# Patient Record
Sex: FEMALE | Race: ASIAN | NOT HISPANIC OR LATINO | URBAN - METROPOLITAN AREA
[De-identification: names, ages, dates, MRNs, and addresses within clinical notes are randomized per-mention and may not be internally consistent; named-entity substitution may affect disease eponyms.]

---

## 2019-12-25 ENCOUNTER — EMERGENCY (EMERGENCY)
Facility: HOSPITAL | Age: 54
LOS: 1 days | Discharge: HOME | End: 2019-12-25
Admitting: EMERGENCY MEDICINE
Payer: MEDICAID

## 2019-12-25 VITALS
OXYGEN SATURATION: 100 % | RESPIRATION RATE: 16 BRPM | HEART RATE: 82 BPM | SYSTOLIC BLOOD PRESSURE: 180 MMHG | DIASTOLIC BLOOD PRESSURE: 110 MMHG | TEMPERATURE: 98 F

## 2019-12-25 VITALS
DIASTOLIC BLOOD PRESSURE: 109 MMHG | HEART RATE: 89 BPM | OXYGEN SATURATION: 100 % | RESPIRATION RATE: 17 BRPM | SYSTOLIC BLOOD PRESSURE: 154 MMHG

## 2019-12-25 PROCEDURE — 99283 EMERGENCY DEPT VISIT LOW MDM: CPT

## 2019-12-25 RX ORDER — OXYCODONE AND ACETAMINOPHEN 5; 325 MG/1; MG/1
1 TABLET ORAL ONCE
Refills: 0 | Status: DISCONTINUED | OUTPATIENT
Start: 2019-12-25 | End: 2019-12-25

## 2019-12-25 RX ORDER — IBUPROFEN 200 MG
1 TABLET ORAL
Qty: 30 | Refills: 0
Start: 2019-12-25

## 2019-12-25 RX ORDER — OXYCODONE AND ACETAMINOPHEN 5; 325 MG/1; MG/1
1 TABLET ORAL
Qty: 8 | Refills: 0
Start: 2019-12-25 | End: 2019-12-26

## 2019-12-25 RX ADMIN — OXYCODONE AND ACETAMINOPHEN 1 TABLET(S): 5; 325 TABLET ORAL at 21:02

## 2019-12-25 NOTE — ED PROVIDER NOTE - CLINICAL SUMMARY MEDICAL DECISION MAKING FREE TEXT BOX
55 y/o F w/ tooth pain, on abx, due for a root canal, here for pain control. No signs of gingival edema to suggest abscess. Plan pain control, outpt dental follow up. Not starting abx as pt is already taking them.

## 2019-12-25 NOTE — ED PROVIDER NOTE - NS ED ROS FT
ROS:  GENERAL: No fever, no chills  EYES: no change in vision  HEENT: no trouble swallowing, no trouble speaking, +tooth pain  CARDIAC: no chest pain  PULMONARY: no cough, no shortness of breath  GI: no abdominal pain, no nausea, no vomiting, no diarrhea, no constipation  SKIN: no rashes

## 2019-12-25 NOTE — ED PROVIDER NOTE - PATIENT PORTAL LINK FT
You can access the FollowMyHealth Patient Portal offered by [facility] by registering at the following website: [email or web address]. By joining [facility]’s FollowMyHealth portal, you will also be able to view your health information using other applications (apps) compatible with our system.

## 2019-12-25 NOTE — ED PROVIDER NOTE - OBJECTIVE STATEMENT
55 y/o F PMHx HTN here c/o left upper tooth pain x 1 month, intermittent, worse today. Has seen multiple dentists and told she needs a root canal. Pt just finished a course of abx today. Has not followed up with her dentist yet. Taking Motrin at home w/o relief of pain. Denies fevers, chills, facial swelling or any other complaints.

## 2019-12-25 NOTE — ED PROVIDER NOTE - NSFOLLOWUPINSTRUCTIONS_ED_ALL_ED_FT
Follow up with your private dentist within 48-72 hrs or our clinic [phone]. Take Motrin 600mg every 8hrs with food for pain. Take percocet 1-2 tabs every 6hrs for severe pain- caution drowsiness- do not drive or operate heavy machinery.  Worsening pain, new facial swelling, fever, chills return to ER

## 2020-01-01 ENCOUNTER — OUTPATIENT (OUTPATIENT)
Dept: OUTPATIENT SERVICES | Facility: HOSPITAL | Age: 55
LOS: 1 days | End: 2020-01-01
Payer: MEDICAID

## 2020-01-01 PROCEDURE — G9001: CPT

## 2020-01-06 DIAGNOSIS — Z71.89 OTHER SPECIFIED COUNSELING: ICD-10-CM

## 2024-05-13 ENCOUNTER — INPATIENT (INPATIENT)
Facility: HOSPITAL | Age: 59
LOS: 0 days | Discharge: HOME | End: 2024-05-14
Attending: STUDENT IN AN ORGANIZED HEALTH CARE EDUCATION/TRAINING PROGRAM | Admitting: STUDENT IN AN ORGANIZED HEALTH CARE EDUCATION/TRAINING PROGRAM
Payer: MEDICAID

## 2024-05-13 VITALS
SYSTOLIC BLOOD PRESSURE: 168 MMHG | DIASTOLIC BLOOD PRESSURE: 90 MMHG | TEMPERATURE: 98 F | OXYGEN SATURATION: 98 % | RESPIRATION RATE: 18 BRPM | HEART RATE: 96 BPM

## 2024-05-13 LAB
ALBUMIN SERPL ELPH-MCNC: 4.1 G/DL — SIGNIFICANT CHANGE UP (ref 3.3–5)
ALP SERPL-CCNC: 92 U/L — SIGNIFICANT CHANGE UP (ref 40–120)
ALT FLD-CCNC: 25 U/L — SIGNIFICANT CHANGE UP (ref 4–33)
ANION GAP SERPL CALC-SCNC: 15 MMOL/L — HIGH (ref 7–14)
AST SERPL-CCNC: 19 U/L — SIGNIFICANT CHANGE UP (ref 4–32)
BASOPHILS # BLD AUTO: 0.05 K/UL — SIGNIFICANT CHANGE UP (ref 0–0.2)
BASOPHILS NFR BLD AUTO: 0.5 % — SIGNIFICANT CHANGE UP (ref 0–2)
BILIRUB SERPL-MCNC: 0.2 MG/DL — SIGNIFICANT CHANGE UP (ref 0.2–1.2)
BUN SERPL-MCNC: 16 MG/DL — SIGNIFICANT CHANGE UP (ref 7–23)
CALCIUM SERPL-MCNC: 9.5 MG/DL — SIGNIFICANT CHANGE UP (ref 8.4–10.5)
CHLORIDE SERPL-SCNC: 103 MMOL/L — SIGNIFICANT CHANGE UP (ref 98–107)
CO2 SERPL-SCNC: 23 MMOL/L — SIGNIFICANT CHANGE UP (ref 22–31)
CREAT SERPL-MCNC: 0.55 MG/DL — SIGNIFICANT CHANGE UP (ref 0.5–1.3)
EGFR: 108 ML/MIN/1.73M2 — SIGNIFICANT CHANGE UP
EOSINOPHIL # BLD AUTO: 0.11 K/UL — SIGNIFICANT CHANGE UP (ref 0–0.5)
EOSINOPHIL NFR BLD AUTO: 1.2 % — SIGNIFICANT CHANGE UP (ref 0–6)
GLUCOSE SERPL-MCNC: 110 MG/DL — HIGH (ref 70–99)
HCT VFR BLD CALC: 44.6 % — SIGNIFICANT CHANGE UP (ref 34.5–45)
HGB BLD-MCNC: 13.9 G/DL — SIGNIFICANT CHANGE UP (ref 11.5–15.5)
IANC: 4.52 K/UL — SIGNIFICANT CHANGE UP (ref 1.8–7.4)
IMM GRANULOCYTES NFR BLD AUTO: 0.2 % — SIGNIFICANT CHANGE UP (ref 0–0.9)
LYMPHOCYTES # BLD AUTO: 3.5 K/UL — HIGH (ref 1–3.3)
LYMPHOCYTES # BLD AUTO: 38.3 % — SIGNIFICANT CHANGE UP (ref 13–44)
MCHC RBC-ENTMCNC: 25.9 PG — LOW (ref 27–34)
MCHC RBC-ENTMCNC: 31.2 GM/DL — LOW (ref 32–36)
MCV RBC AUTO: 83.2 FL — SIGNIFICANT CHANGE UP (ref 80–100)
MONOCYTES # BLD AUTO: 0.94 K/UL — HIGH (ref 0–0.9)
MONOCYTES NFR BLD AUTO: 10.3 % — SIGNIFICANT CHANGE UP (ref 2–14)
NEUTROPHILS # BLD AUTO: 4.52 K/UL — SIGNIFICANT CHANGE UP (ref 1.8–7.4)
NEUTROPHILS NFR BLD AUTO: 49.5 % — SIGNIFICANT CHANGE UP (ref 43–77)
NRBC # BLD: 0 /100 WBCS — SIGNIFICANT CHANGE UP (ref 0–0)
NRBC # FLD: 0 K/UL — SIGNIFICANT CHANGE UP (ref 0–0)
NT-PROBNP SERPL-SCNC: 39 PG/ML — SIGNIFICANT CHANGE UP
PLATELET # BLD AUTO: 315 K/UL — SIGNIFICANT CHANGE UP (ref 150–400)
POTASSIUM SERPL-MCNC: 4.4 MMOL/L — SIGNIFICANT CHANGE UP (ref 3.5–5.3)
POTASSIUM SERPL-SCNC: 4.4 MMOL/L — SIGNIFICANT CHANGE UP (ref 3.5–5.3)
PROT SERPL-MCNC: 8 G/DL — SIGNIFICANT CHANGE UP (ref 6–8.3)
RBC # BLD: 5.36 M/UL — HIGH (ref 3.8–5.2)
RBC # FLD: 16.1 % — HIGH (ref 10.3–14.5)
SODIUM SERPL-SCNC: 141 MMOL/L — SIGNIFICANT CHANGE UP (ref 135–145)
TROPONIN T, HIGH SENSITIVITY RESULT: 10 NG/L — SIGNIFICANT CHANGE UP
WBC # BLD: 9.14 K/UL — SIGNIFICANT CHANGE UP (ref 3.8–10.5)
WBC # FLD AUTO: 9.14 K/UL — SIGNIFICANT CHANGE UP (ref 3.8–10.5)

## 2024-05-13 PROCEDURE — 99285 EMERGENCY DEPT VISIT HI MDM: CPT

## 2024-05-13 PROCEDURE — 71046 X-RAY EXAM CHEST 2 VIEWS: CPT | Mod: 26

## 2024-05-13 RX ORDER — ASPIRIN/CALCIUM CARB/MAGNESIUM 324 MG
324 TABLET ORAL ONCE
Refills: 0 | Status: COMPLETED | OUTPATIENT
Start: 2024-05-13 | End: 2024-05-13

## 2024-05-13 RX ADMIN — Medication 324 MILLIGRAM(S): at 20:19

## 2024-05-13 NOTE — ED PROVIDER NOTE - OBJECTIVE STATEMENT
58 year old morbidly obese fm with pmhx psoriasis, heart murmur presents to the ED with 1 week of intermittent chest pain. patient reports pain to left side "which moves" patient reports not taking anything for symptoms and endorses no relieving or exacerbating factors. patient reports being told at [facility] "many years ago" that she has a murmur but never had it worked up. patient denies pitting edema, Shortness of Breath, abdominal pain, Nausea/Vomiting/Diarrhea, dizziness, weakness, confusion, vision changes, urinary symptoms, syncope, falls, trauma, discharge, bleeding, fevers. patient never seen by cardiologist, no prior echo or stress done

## 2024-05-13 NOTE — ED PROVIDER NOTE - ATTENDING APP SHARED VISIT CONTRIBUTION OF CARE
Attending MD Eagle:  I performed a history and physical exam of the patient and discussed their management with the PA. I reviewed the PA's note and agree with the documented findings and plan of care. My medical decision making and observations are found above.

## 2024-05-13 NOTE — ED PROVIDER NOTE - PHYSICAL EXAMINATION
On Physical Exam:  General: well appearing, in NAD, speaking clearly in full sentences and without difficulty; cooperative with exam  HEENT: PERRL, MMM  Neck: no neck tenderness, no nuchal rigidity  Cardiac: Murmur, normal s1, s2; RRR; no GR  Lungs: CTABL  Abdomen: soft nontender/nondistended  : no bladder tenderness or distension  Skin: warm, intact, no rash  Extremities: no pitting edema, no peripheral edema, no gross deformities  Neuro: no gross neurologic deficits

## 2024-05-13 NOTE — ED PROVIDER NOTE - CLINICAL SUMMARY MEDICAL DECISION MAKING FREE TEXT BOX
58 year old morbidly obese fm with pmhx psoriasis, heart murmur presents to the ED with 1 week of intermittent chest pain. no exacerbating factors  r/o ACS vs MSK  labs, CXR, EKG, cardiac monitoring, ASA, reassess likely CDU for cardiac work up 58 year old morbidly obese fm with pmhx psoriasis, heart murmur presents to the ED with 1 week of intermittent chest pain. no exacerbating factors  r/o ACS vs MSK  labs, CXR, EKG, cardiac monitoring, ASA, reassess likely CDU for cardiac work up    Attending MD Eagle.  Agree with above.  Pt is a 57 yo fem with CP x 1 wk no hx of ACS eval.  Also noted murmur today, unclear origin or chroncity.  Planned ACS screening and overnight stay for tele monitoring and probable stress/echo 2/2 lack of prev eval and concerning hx of CP with potential new murmur.

## 2024-05-14 ENCOUNTER — RESULT REVIEW (OUTPATIENT)
Age: 59
End: 2024-05-14

## 2024-05-14 ENCOUNTER — TRANSCRIPTION ENCOUNTER (OUTPATIENT)
Age: 59
End: 2024-05-14

## 2024-05-14 VITALS
DIASTOLIC BLOOD PRESSURE: 80 MMHG | SYSTOLIC BLOOD PRESSURE: 143 MMHG | RESPIRATION RATE: 18 BRPM | HEART RATE: 94 BPM | OXYGEN SATURATION: 100 % | TEMPERATURE: 99 F

## 2024-05-14 DIAGNOSIS — R07.9 CHEST PAIN, UNSPECIFIED: ICD-10-CM

## 2024-05-14 DIAGNOSIS — Z29.9 ENCOUNTER FOR PROPHYLACTIC MEASURES, UNSPECIFIED: ICD-10-CM

## 2024-05-14 DIAGNOSIS — J02.9 ACUTE PHARYNGITIS, UNSPECIFIED: ICD-10-CM

## 2024-05-14 DIAGNOSIS — I10 ESSENTIAL (PRIMARY) HYPERTENSION: ICD-10-CM

## 2024-05-14 DIAGNOSIS — Z98.890 OTHER SPECIFIED POSTPROCEDURAL STATES: Chronic | ICD-10-CM

## 2024-05-14 LAB — TROPONIN T, HIGH SENSITIVITY RESULT: 8 NG/L — SIGNIFICANT CHANGE UP

## 2024-05-14 PROCEDURE — 93970 EXTREMITY STUDY: CPT | Mod: 26

## 2024-05-14 PROCEDURE — 93016 CV STRESS TEST SUPVJ ONLY: CPT

## 2024-05-14 PROCEDURE — 93018 CV STRESS TEST I&R ONLY: CPT

## 2024-05-14 PROCEDURE — 78451 HT MUSCLE IMAGE SPECT SING: CPT | Mod: 26

## 2024-05-14 PROCEDURE — 93306 TTE W/DOPPLER COMPLETE: CPT | Mod: 26

## 2024-05-14 PROCEDURE — 99223 1ST HOSP IP/OBS HIGH 75: CPT

## 2024-05-14 RX ORDER — AMLODIPINE BESYLATE 2.5 MG/1
1 TABLET ORAL
Qty: 30 | Refills: 0
Start: 2024-05-14 | End: 2024-06-12

## 2024-05-14 RX ORDER — ENOXAPARIN SODIUM 100 MG/ML
40 INJECTION SUBCUTANEOUS EVERY 24 HOURS
Refills: 0 | Status: DISCONTINUED | OUTPATIENT
Start: 2024-05-14 | End: 2024-05-14

## 2024-05-14 RX ORDER — CEFUROXIME AXETIL 250 MG
1 TABLET ORAL
Refills: 0 | DISCHARGE

## 2024-05-14 RX ORDER — ASCORBIC ACID 60 MG
1 TABLET,CHEWABLE ORAL
Refills: 0 | DISCHARGE

## 2024-05-14 RX ORDER — ENOXAPARIN SODIUM 100 MG/ML
40 INJECTION SUBCUTANEOUS EVERY 12 HOURS
Refills: 0 | Status: DISCONTINUED | OUTPATIENT
Start: 2024-05-14 | End: 2024-05-14

## 2024-05-14 RX ORDER — ASCORBIC ACID 60 MG
500 TABLET,CHEWABLE ORAL DAILY
Refills: 0 | Status: DISCONTINUED | OUTPATIENT
Start: 2024-05-14 | End: 2024-05-14

## 2024-05-14 RX ORDER — CEFUROXIME AXETIL 250 MG
500 TABLET ORAL EVERY 12 HOURS
Refills: 0 | Status: DISCONTINUED | OUTPATIENT
Start: 2024-05-14 | End: 2024-05-14

## 2024-05-14 RX ORDER — PANTOPRAZOLE SODIUM 20 MG/1
1 TABLET, DELAYED RELEASE ORAL
Qty: 30 | Refills: 0
Start: 2024-05-14 | End: 2024-06-12

## 2024-05-14 RX ORDER — AMLODIPINE BESYLATE 2.5 MG/1
5 TABLET ORAL DAILY
Refills: 0 | Status: DISCONTINUED | OUTPATIENT
Start: 2024-05-14 | End: 2024-05-14

## 2024-05-14 RX ORDER — ACETAMINOPHEN 500 MG
650 TABLET ORAL EVERY 6 HOURS
Refills: 0 | Status: DISCONTINUED | OUTPATIENT
Start: 2024-05-14 | End: 2024-05-14

## 2024-05-14 RX ORDER — AMLODIPINE BESYLATE 2.5 MG/1
1 TABLET ORAL
Refills: 0 | DISCHARGE

## 2024-05-14 RX ADMIN — Medication 500 MILLIGRAM(S): at 11:46

## 2024-05-14 RX ADMIN — Medication 500 MILLIGRAM(S): at 07:29

## 2024-05-14 RX ADMIN — ENOXAPARIN SODIUM 40 MILLIGRAM(S): 100 INJECTION SUBCUTANEOUS at 17:33

## 2024-05-14 RX ADMIN — AMLODIPINE BESYLATE 5 MILLIGRAM(S): 2.5 TABLET ORAL at 05:28

## 2024-05-14 RX ADMIN — Medication 500 MILLIGRAM(S): at 17:33

## 2024-05-14 NOTE — PROGRESS NOTE ADULT - SUBJECTIVE AND OBJECTIVE BOX
Alesha Lee  [facility] Medicine  Pager #[phone]  Available on [employer] Teams    Patient is a 58y old  Female who presents with a chief complaint of chest pain x1 week (14 May 2024 02:51)      SUBJECTIVE / OVERNIGHT EVENTS: Patient seen and examined at bedside. Pt's family at bedside. Pt endorses ongoing discomfort in the middle of her chest.    ADDITIONAL REVIEW OF SYSTEMS:    MEDICATIONS  (STANDING):  amLODIPine   Tablet 5 milliGRAM(s) Oral daily  ascorbic acid 500 milliGRAM(s) Oral daily  cefuroxime   Tablet 500 milliGRAM(s) Oral every 12 hours  enoxaparin Injectable 40 milliGRAM(s) SubCutaneous every 12 hours    MEDICATIONS  (PRN):  acetaminophen     Tablet .. 650 milliGRAM(s) Oral every 6 hours PRN Temp greater or equal to 38C (100.4F), Mild Pain (1 - 3)      CAPILLARY BLOOD GLUCOSE        I&O's Summary      PHYSICAL EXAM:    Vital Signs Last 24 Hrs  T(C): 36.4 (14 May 2024 09:30), Max: 36.7 (13 May 2024 18:25)  T(F): 97.6 (14 May 2024 09:30), Max: 98.1 (13 May 2024 18:25)  HR: 98 (14 May 2024 09:30) (80 - 98)  BP: 139/89 (14 May 2024 09:30) (139/89 - 168/90)  BP(mean): --  RR: 17 (14 May 2024 09:30) (16 - 18)  SpO2: 98% (14 May 2024 09:30) (94% - 100%)    Parameters below as of 14 May 2024 09:30  Patient On (Oxygen Delivery Method): room air    CONSTITUTIONAL: NAD, well-developed, obese  RESPIRATORY: Normal respiratory effort; lungs are clear to auscultation bilaterally  CARDIOVASCULAR: Regular rate and rhythm, normal S1 and S2, +systolic murmur  ABDOMEN: Soft, nontender to palpation, normoactive bowel sounds  PSYCH: A+O to person, place, and time; affect appropriate  NEUROLOGY: CN 2-12 are intact and symmetric; no gross sensory deficits   SKIN: No rashes; no palpable lesions    LABS:                        13.9   9.14  )-----------( 315      ( 13 May 2024 21:17 )             44.6     05-13    141  |  103  |  16  ----------------------------<  110<H>  4.4   |  23  |  0.55    Ca    9.5      13 May 2024 21:17    TPro  8.0  /  Alb  4.1  /  TBili  0.2  /  DBili  x   /  AST  19  /  ALT  25  /  AlkPhos  92  05-13          Urinalysis Basic - ( 13 May 2024 21:17 )    Color: x / Appearance: x / SG: x / pH: x  Gluc: 110 mg/dL / Ketone: x  / Bili: x / Urobili: x   Blood: x / Protein: x / Nitrite: x   Leuk Esterase: x / RBC: x / WBC x   Sq Epi: x / Non Sq Epi: x / Bacteria: x          RADIOLOGY & ADDITIONAL TESTS: No new imaging  Results Reviewed: Yes  Imaging Personally Reviewed:  Electrocardiogram Personally Reviewed:    COORDINATION OF CARE:  Care Discussed with Consultants/Other Providers [Y/N]:  Prior or Outpatient Records Reviewed [Y/N]:

## 2024-05-14 NOTE — PROGRESS NOTE ADULT - PROBLEM SELECTOR PLAN 1
Atypical CP. Not exertional  -wells score 0. Not tachycardic, not hypoxic. No pleuritic CP. Low suspicion for PE. Has mild LE edema bilaterally but typical for her psoriasis per patient - duplex negative for DVT  -monitor on tele  -echo  -trop 10--8, EKG with TWI in V1-V6. low suspicion for ACS  -NST ordered  -cardiology consulted. pt states she has never seen a cardiologist in the past but was told that she has a heart murmur

## 2024-05-14 NOTE — DISCHARGE NOTE PROVIDER - PROVIDER TOKENS
FREE:[LAST:[PCP],PHONE:[(   )    -],FAX:[(   )    -]] FREE:[LAST:[PCP],PHONE:[(   )    -],FAX:[(   )    -]],PROVIDER:[TOKEN:[[id number]]]

## 2024-05-14 NOTE — ED ADULT NURSE NOTE - OBJECTIVE STATEMENT
pt presents to ED A&04 ambulatory at baseline coming in complaining of intermittent episodes of chest pain radiating to left side. history of heart murmur, psoriasis. Respirations even and unlabored. Lung sounds clear with equal chest rise bilaterally. ABD is soft, non tender, non distended with normal active bowel sounds No complaints of headache, nausea, dizziness, vomiting  SOB, fever, chills verbalized. 20GLAC in place, NSR on cardiac monitor.

## 2024-05-14 NOTE — DISCHARGE NOTE PROVIDER - NSDCCPTREATMENT_GEN_ALL_CORE_FT
PRINCIPAL PROCEDURE  Procedure: Echo 2D  Findings and Treatment: 5/14/2024:      1. Left ventricular systolic function is normal with an ejection fraction of 69 % by Glez's method of disks. There are no regional wall motion abnormalities seen.   2. Severe left ventricular hypertrophy.   3. Normal right ventricular cavity size and normal systolic function. Tricuspid annular plane systolic excursion (TAPSE) is 2.2 cm (normal >=1.7 cm).   4. The left atrium is mildly dilated.   5. The right atrium is normal in size.   6. The inferior vena cava is normal in size measuring 1.46 cm in diameter, (normal <2.1cm) with normal inspiratory collapse (normal >50%) consistent with normal right atrial pressure (~3, range 0-5mmHg).   7. No pericardial effusion seen.   8. Technically difficult image quality.

## 2024-05-14 NOTE — CONSULT NOTE ADULT - ASSESSMENT
Chest pain   no evidence of acute MI   Echo   Stress test     HTN  monitor for now   consider low dose amlodipine if still elevated      Advanced care planning was discussed with patient and family.  Risks, benefits and alternatives of the cardiac treatments and medical therapy including procedures were discussed in detail and all questions were answered. Importance of compliance with medical therapy and lifestyle modification to improve cardiovascular health were addressed. Appropriate forms and patient educational materials were reviewed. 30 minutes face to face spent.

## 2024-05-14 NOTE — DISCHARGE NOTE PROVIDER - HOSPITAL COURSE
58F with PMHx obesity, psoriasis, heart murmur, HTN presenting with chest pain x1 week.      Problem/Plan - 1:  ·  Problem: Chest pain.   ·  Plan: Atypical CP. Not exertional  -wells score 0. Not tachycardic, not hypoxic. No pleuritic CP. Low suspicion for PE. Has mild LE edema bilaterally but typical for her psoriasis per patient - duplex negative for DVT  -monitor on tele  -echo- EF 69%  -trop 10--8, EKG with TWI in V1-V6. low suspicion for ACS  -NST- not suggestive for ischemia   -cardiology consulted. pt states she has never seen a cardiologist in the past but was told that she has a heart murmur.    Problem/Plan - 2:  ·  Problem: HTN (hypertension).   ·  Plan: C/w amlodipine 5mg qd.    Problem/Plan - 3:  ·  Problem: Throat infection.   ·  Plan: C/w cefuroxime 500mg BID, end date 5/18.    Problem/Plan - 4:  ·  Problem: Need for prophylactic measure.   ·  Plan: Lovenox  DASH/TLC diet.   58F with PMHx obesity, psoriasis, heart murmur, HTN presenting with chest pain x1 week.    Patient underwent stress testing with normal results, other than elevated BP being noted. TTE showed diastolic dysfunction and increased LVH. Patient's amlodipine increased to 10mg. Patient also to be started on PPI trial for atypical chest pain.

## 2024-05-14 NOTE — H&P ADULT - HISTORY OF PRESENT ILLNESS
58F with PMHx obesity, psoriasis, heart murmur, HTN presenting with chest pain x1 week. Pain feels "like a pain moving around my chest", 5/10, nonradiating and comes and goes. Pain is not associated with exertion or PO intake. She is not on blood thinners. Denies prior cardiac w/u. Told of heart murmur in the past? She has LE swelling that is intermittent but dependent on when her psoriasis flares up. Denies asymmetric LE swelling, hx DVT/PE, recent travel, pleuritic CP, SOB. Denies fevers, chills, cough, LH, dizziness, palpitations, abdominal pain, vomiting, diarrhea, dysuria, hematuria. Of not was started on cefuroxime 500mg BID x5 days yesterday for a throat infection by PMD.

## 2024-05-14 NOTE — DISCHARGE NOTE NURSING/CASE MANAGEMENT/SOCIAL WORK - PATIENT PORTAL LINK FT
You can access the FollowMyHealth Patient Portal offered by [facility] by registering at the following website: [email or web address]. By joining [employer]’s FollowMyHealth portal, you will also be able to view your health information using other applications (apps) compatible with our system.

## 2024-05-14 NOTE — DISCHARGE NOTE PROVIDER - CARE PROVIDER_API CALL
PCP,   Phone: (   )    -  Fax: (   )    -  Follow Up Time:    PCP,   Phone: (   )    -  Fax: (   )    -  Follow Up Time:     Noe Granados  Cardiovascular Disease  [address] [address]  Phone: [phone]  Fax: [phone]  Follow Up Time:

## 2024-05-14 NOTE — H&P ADULT - PROBLEM SELECTOR PLAN 1
Atypical CP. Not exertional  -wells score 0. Not tachycardic, not hypoxic. No pleuritic CP. Low suspicion for PE. Has mild LE edema bilaterally but typical for her psoriasis per patient - duplex ordered  -tele  -echo  -trop 10--8, EKG with TWI in V1-V6. CP resolved. Not ACS  -NST ordered  -cardiology c/s in AM

## 2024-05-14 NOTE — DISCHARGE NOTE PROVIDER - NSDCCPCAREPLAN_GEN_ALL_CORE_FT
PRINCIPAL DISCHARGE DIAGNOSIS  Diagnosis: Chest pain  Assessment and Plan of Treatment: Follow-up with your primary provider/cardiologist as outpatient for ongoing care. If you have persistent chest pain, shortness of breath, heart palpitations, or dizziness you should return to the emergency room.        SECONDARY DISCHARGE DIAGNOSES  Diagnosis: Throat infection  Assessment and Plan of Treatment: Please continue with you pill antibiotic to completed the course .    Diagnosis: HTN (hypertension)  Assessment and Plan of Treatment: .Continue blood pressure medication regimen as directed. Monitor for any visual changes, headaches or dizziness.  Monitor blood pressure regularly.  Follow up with your primary care provider for further management for high blood pressure.     PRINCIPAL DISCHARGE DIAGNOSIS  Diagnosis: Chest pain  Assessment and Plan of Treatment: Follow-up with your primary provider/cardiologist as outpatient for ongoing care. Your stress test did not show any abnormalities with your heart function. You also had an echocardiogram of your heart which showed changes related to chronic high blood pressure. You will be prescribed pantoprazole, which is a medication for gastric reflux, in order to see if it may help with your symptoms. Follow up with cardiologist Dr. Granados outpatient. If you have persistent chest pain, shortness of breath, heart palpitations, or dizziness you should return to the emergency room.        SECONDARY DISCHARGE DIAGNOSES  Diagnosis: HTN (hypertension)  Assessment and Plan of Treatment: Your amlodipine dose was increased to 10mg because your blood pressure was elevated in the hospital. Monitor blood pressure regularly.  Follow up with your primary care provider for further management for high blood pressure.    Diagnosis: Throat infection  Assessment and Plan of Treatment: Continue with your antibiotics until they are finished.

## 2024-05-14 NOTE — DISCHARGE NOTE PROVIDER - NSDCMRMEDTOKEN_GEN_ALL_CORE_FT
amLODIPine 5 mg oral tablet: 1 tab(s) orally once a day  cefuroxime 500 mg oral tablet: 1 tab(s) orally 2 times a day  Protonix 40 mg oral delayed release tablet: 1 tab(s) orally once a day  Vitamin C 500 mg oral tablet: 1 tab(s) orally once a day   amLODIPine 10 mg oral tablet: 1 tab(s) orally once a day  cefuroxime 500 mg oral tablet: 1 tab(s) orally 2 times a day  Protonix 40 mg oral delayed release tablet: 1 tab(s) orally once a day  Vitamin C 500 mg oral tablet: 1 tab(s) orally once a day

## 2024-05-14 NOTE — H&P ADULT - NSHPLABSRESULTS_GEN_ALL_CORE
Personally reviewed labs:                        13.9   9.14  )-----------( 315      ( 13 May 2024 21:17 )             44.6     05-13-24 @ 21:17    141  |  103  |  16             --------------------------< 110<H>     4.4  |  23  | 0.55    eGFR AA: --  eGFR N-AA: --    Calcium: 9.5  Phosphorus: --  Magnesium: --    AST: 19    ALT: 25  AlkPhos: 92  Protein: 8.0  Albumin: 4.1  TBili: 0.2  D-Bili: --    Troponin 10--8      RADIOLOGY & ADDITIONAL TESTS:    EKG my independent interpretation: sinus tachycardia @ 102bpm, RSR' pattern, TWI in V1-V6    CXR my independent interpretation: clear lungs

## 2024-05-14 NOTE — CONSULT NOTE ADULT - SUBJECTIVE AND OBJECTIVE BOX
CHIEF COMPLAINT:Patient is a 58y old  Female who presents with a chief complaint of chest pain x1 week (14 May 2024 11:21)      HISTORY OF PRESENT ILLNESS:    58 female with history a below presents with chest pain , squeezing sensation substernal , intermittent for the past few days   no known triggers   no sob  no palpitation   no syncope     PAST MEDICAL & SURGICAL HISTORY:  HTN (hypertension)      Psoriasis      Heart murmur      H/O removal of cyst              MEDICATIONS:  amLODIPine   Tablet 5 milliGRAM(s) Oral daily  enoxaparin Injectable 40 milliGRAM(s) SubCutaneous every 12 hours    cefuroxime   Tablet 500 milliGRAM(s) Oral every 12 hours      acetaminophen     Tablet .. 650 milliGRAM(s) Oral every 6 hours PRN        ascorbic acid 500 milliGRAM(s) Oral daily      FAMILY HISTORY:  No pertinent family history in first degree relatives        Non-contributory    SOCIAL HISTORY:    No tobacco, drugs or etoh    Allergies    No Known Allergies    Intolerances    	    REVIEW OF SYSTEMS:  as above  The rest of the 14 points ROS reviewed and except above they are unremarkable.        PHYSICAL EXAM:  T(C): 36.4 (05-14-24 @ 09:30), Max: 36.7 (05-13-24 @ 18:25)  HR: 98 (05-14-24 @ 09:30) (80 - 98)  BP: 139/89 (05-14-24 @ 09:30) (139/89 - 168/90)  RR: 17 (05-14-24 @ 09:30) (16 - 18)  SpO2: 98% (05-14-24 @ 09:30) (94% - 100%)  Wt(kg): --  I&O's Summary      JVP: Normal  Neck: supple  Lung: clear   CV: S1 S2 , Murmur:  Abd: soft  Ext: No edema  neuro: Awake / alert  Psych: flat affect  Skin: normal      LABS/DATA:    TELEMETRY: 	    ECG:  	   	  CARDIAC MARKERS:                        8 <<== 05-14-24 @ 00:41                10 <<== 05-13-24 @ 21:17                              13.9   9.14  )-----------( 315      ( 13 May 2024 21:17 )             44.6     05-13    141  |  103  |  16  ----------------------------<  110<H>  4.4   |  23  |  0.55    Ca    9.5      13 May 2024 21:17    TPro  8.0  /  Alb  4.1  /  TBili  0.2  /  DBili  x   /  AST  19  /  ALT  25  /  AlkPhos  92  05-13    proBNP:   Lipid Profile:   HgA1c:   TSH:

## 2024-05-14 NOTE — ED ADULT NURSE NOTE - NSFALLUNIVINTERV_ED_ALL_ED
Bed/Stretcher in lowest position, wheels locked, appropriate side rails in place/Call bell, personal items and telephone in reach/Instruct patient to call for assistance before getting out of bed/chair/stretcher/Non-slip footwear applied when patient is off stretcher/[employer] to call system/Physically safe environment - no spills, clutter or unnecessary equipment/Purposeful proactive rounding/Room/bathroom lighting operational, light cord in reach

## 2024-05-14 NOTE — H&P ADULT - NSHPREVIEWOFSYSTEMS_GEN_ALL_CORE
ROS:    Constitutional: [ ] fevers [ ] chills  HEENT: [ ] postnasal drip [ ] nasal congestion  CV: [x ] chest pain [ ] orthopnea [ ] palpitations [x ] murmur  Resp: [ ] cough [ ] shortness of breath [ ] dyspnea [ ] wheezing   GI: [ ] nausea [ ] vomiting [ ] diarrhea [ ] constipation [ ] abd pain   : [ ] dysuria  ] increased urinary frequency  Musculoskeletal: [ ] back pain [ ] myalgias [ ] arthralgias  Skin: [ ] rash [ ] itch  Neurological: [ ] headache [ ] dizziness [ ] syncope   Endocrine: [ ] diabetes [ ] thyroid problem  Hematologic/Lymphatic: [ ] anemia [ ] bleeding problem  [ x] All other systems negative

## 2024-05-14 NOTE — H&P ADULT - NSHPPHYSICALEXAM_GEN_ALL_CORE
PHYSICAL EXAM:  Vital Signs Last 24 Hrs  T(C): 36.7 (05-14-24 @ 01:54)  T(F): 98 (05-14-24 @ 01:54), Max: 98.1 (05-13-24 @ 18:25)  HR: 94 (05-14-24 @ 01:54) (94 - 96)  BP: 156/90 (05-14-24 @ 01:54)  BP(mean): --  RR: 17 (05-14-24 @ 01:54) (16 - 18)  SpO2: 99% (05-14-24 @ 01:54) (98% - 100%)  Wt(kg): --    Constitutional: NAD, awake and alert, well developed  EYES: EOMI, conjunctiva clear  ENT:  Normal Hearing, no tonsillar exudates   Neck: Soft and supple , no thyromegaly   Respiratory: Breath sounds are clear bilaterally, No wheezing, rales or rhonchi, no tachypnea, no accessory muscle use  Cardiovascular: S1 and S2, regular rate and rhythm, no Murmurs, gallops or rubs, no JVD, mild nonpitting bilateral LE edema  Gastrointestinal: Bowel Sounds present, soft, nontender, nondistended, no guarding, no rebound  Extremities: No cyanosis or clubbing; warm to touch  Vascular: 2+ peripheral pulses lower ex  Neurological: No focal deficits, CN II-XII intact bilaterally, sensation to light touch intact in all extremities.   Musculoskeletal: 5/5 strength b/l upper and lower extremities; no joint swelling.

## 2024-05-31 ENCOUNTER — NON-APPOINTMENT (OUTPATIENT)
Age: 59
End: 2024-05-31